# Patient Record
Sex: FEMALE | Race: WHITE | NOT HISPANIC OR LATINO
[De-identification: names, ages, dates, MRNs, and addresses within clinical notes are randomized per-mention and may not be internally consistent; named-entity substitution may affect disease eponyms.]

---

## 2022-06-30 ENCOUNTER — NON-APPOINTMENT (OUTPATIENT)
Age: 77
End: 2022-06-30

## 2022-07-01 ENCOUNTER — TRANSCRIPTION ENCOUNTER (OUTPATIENT)
Age: 77
End: 2022-07-01

## 2022-07-12 ENCOUNTER — NON-APPOINTMENT (OUTPATIENT)
Age: 77
End: 2022-07-12

## 2022-11-27 ENCOUNTER — NON-APPOINTMENT (OUTPATIENT)
Age: 77
End: 2022-11-27

## 2023-12-23 ENCOUNTER — NON-APPOINTMENT (OUTPATIENT)
Age: 78
End: 2023-12-23

## 2024-04-21 ENCOUNTER — EMERGENCY (EMERGENCY)
Facility: HOSPITAL | Age: 79
LOS: 0 days | Discharge: HOME | End: 2024-04-21
Attending: EMERGENCY MEDICINE
Payer: MEDICARE

## 2024-04-21 VITALS
SYSTOLIC BLOOD PRESSURE: 176 MMHG | TEMPERATURE: 98 F | RESPIRATION RATE: 18 BRPM | DIASTOLIC BLOOD PRESSURE: 96 MMHG | OXYGEN SATURATION: 99 % | HEART RATE: 72 BPM | HEIGHT: 64 IN | WEIGHT: 145.06 LBS

## 2024-04-21 DIAGNOSIS — E78.5 HYPERLIPIDEMIA, UNSPECIFIED: ICD-10-CM

## 2024-04-21 DIAGNOSIS — S42.212A UNSPECIFIED DISPLACED FRACTURE OF SURGICAL NECK OF LEFT HUMERUS, INITIAL ENCOUNTER FOR CLOSED FRACTURE: ICD-10-CM
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
## 2024-04-21 DIAGNOSIS — E03.9 HYPOTHYROIDISM, UNSPECIFIED: ICD-10-CM

## 2024-04-21 DIAGNOSIS — M25.512 PAIN IN LEFT SHOULDER: ICD-10-CM

## 2024-04-21 PROCEDURE — 73060 X-RAY EXAM OF HUMERUS: CPT | Mod: 26,LT

## 2024-04-21 PROCEDURE — 73060 X-RAY EXAM OF HUMERUS: CPT | Mod: LT

## 2024-04-21 PROCEDURE — 99284 EMERGENCY DEPT VISIT MOD MDM: CPT | Mod: 25

## 2024-04-21 PROCEDURE — 73030 X-RAY EXAM OF SHOULDER: CPT | Mod: 26,LT

## 2024-04-21 PROCEDURE — 73070 X-RAY EXAM OF ELBOW: CPT | Mod: LT

## 2024-04-21 PROCEDURE — 99284 EMERGENCY DEPT VISIT MOD MDM: CPT | Mod: 57

## 2024-04-21 PROCEDURE — 23600 CLTX PROX HUMRL FX W/O MNPJ: CPT | Mod: 54,LT

## 2024-04-21 PROCEDURE — 73070 X-RAY EXAM OF ELBOW: CPT | Mod: 26,LT

## 2024-04-21 PROCEDURE — 73030 X-RAY EXAM OF SHOULDER: CPT | Mod: LT

## 2024-04-21 RX ORDER — KETOROLAC TROMETHAMINE 30 MG/ML
1 SYRINGE (ML) INJECTION
Qty: 12 | Refills: 0
Start: 2024-04-21 | End: 2024-04-24

## 2024-04-21 NOTE — ED PROVIDER NOTE - NSFOLLOWUPINSTRUCTIONS_ED_ALL_ED_FT
Please follow up with your primary care physician within 24-72 hours and return immediately if symptoms worsen.    Our Emergency Department Referral Coordinators will be reaching out to you in the next 24-48 hours from 9:00am to 5:00pm with a follow up appointment. Please expect a phone call from the hospital in that time frame. If you do not receive a call or if you have any questions or concerns, you can reach them at   [phone]        Proximal Humerus Fracture    WHAT YOU NEED TO KNOW:    A proximal humerus fracture is a crack or break in the top of your upper arm bone. The proximal humerus is one of the bones in your shoulder joint.    Shoulder Anatomy         DISCHARGE INSTRUCTIONS:    Seek care immediately if:   •Your pain does not get better or gets worse, even after you rest and take medicine.      •Your arm, hand, or fingers feel numb.      •The skin over your fracture is swollen, cold, or pale.      •You cannot move your arm, hand, or fingers.      Call your doctor or orthopedist if:   •You have a fever.      •Your sling gets wet, damaged, or falls off.      •You have questions or concerns about your condition or care.      Medicines: You may need any of the following:   •Prescription pain medicine may be given. Ask your healthcare provider how to take this medicine safely. Some prescription pain medicines contain acetaminophen. Do not take other medicines that contain acetaminophen without talking to your healthcare provider. Too much acetaminophen may cause liver damage. Prescription pain medicine may cause constipation. Ask your healthcare provider how to prevent or treat constipation.       •NSAIDs, such as ibuprofen, help decrease swelling, pain, and fever. This medicine is available with or without a doctor's order. NSAIDs can cause stomach bleeding or kidney problems in certain people. If you take blood thinner medicine, always ask your healthcare provider if NSAIDs are safe for you. Always read the medicine label and follow directions.      •Acetaminophen decreases pain and fever. It is available without a doctor's order. Ask how much to take and how often to take it. Follow directions. Read the labels of all other medicines you are using to see if they also contain acetaminophen, or ask your doctor or pharmacist. Acetaminophen can cause liver damage if not taken correctly. Do not use more than 4 grams (4,000 milligrams) total of acetaminophen in one day.       •Take your medicine as directed. Contact your healthcare provider if you think your medicine is not helping or if you have side effects. Tell him of her if you are allergic to any medicine. Keep a list of the medicines, vitamins, and herbs you take. Include the amounts, and when and why you take them. Bring the list or the pill bottles to follow-up visits. Carry your medicine list with you in case of an emergency.      A sling may be needed to hold your broken bones in place. It will decrease your arm movement and allow the bones to heal.    Shoulder Sling         Manage your symptoms:   •Rest your arm as much as possible. Ask your healthcare provider when you can move your arm. Also ask when you can return to sports or vigorous exercises.      •Apply ice on your arm for 15 to 20 minutes every hour or as directed. Use an ice pack, or put crushed ice in a plastic bag. Cover it with a towel before you put it on your arm. Ice helps prevent tissue damage and decreases swelling and pain.      •Go to physical therapy as directed. A physical therapist teaches you exercises to help improve movement and strength, and to decrease pain.       Follow up with your doctor or orthopedist as directed: Write down your questions so you remember to ask them during your visits.       © Copyright [employer] 2021

## 2024-04-21 NOTE — ED ADULT TRIAGE NOTE - CHIEF COMPLAINT QUOTE
Pt BIBEMS for slip and fall and landing onto left shoulder. Inability to move left shoulder with bruising to shoulder. No Loc no dizziness no AC

## 2024-04-21 NOTE — ED ADULT NURSE NOTE - NS_SISCREENINGSR_GEN_ALL_ED
Pt, c/o cough, nasal & chest congestion,  Fever, body aches started yesterday,  Pain in  Lower chest when she coughs
Negative

## 2024-04-21 NOTE — ED PROVIDER NOTE - CLINICAL SUMMARY MEDICAL DECISION MAKING FREE TEXT BOX
Patient presented status post mechanical fall as documented, complaining primarily of left arm pain.  Denies head trauma or LOC.  Otherwise on arrival, patient afebrile, hemodynamic stable, fully neurovascularly intact, with tenderness noted to the proximal aspect of the left humerus.  X-rays obtained and revealed a proximal humeral fracture but no other acute traumatic injury.  Pain otherwise controlled in ED and patient placed in sling.  Able to ambulate without difficulty.  Given the above, will discharge home with outpatient orthopedic follow up. Patient agreeable with plan. Agrees to return to ED for any new or worsening symptoms.

## 2024-04-21 NOTE — ED PROVIDER NOTE - NSPTACCESSSVCSAPPTDETAILS_ED_ALL_ED_FT
Called patient to reschedule echo. I had to l/m.  Thank you  Electronically signed by Nancy Corea on 3/4/2022 at 3:04 PM
humeral head fx

## 2024-04-21 NOTE — ED PROVIDER NOTE - PHYSICAL EXAMINATION
Gen: NAD, AOx3  Head: NCAT  HEENT: PERRL, oral mucosa moist, normal conjunctiva, oropharynx clear without exudate or erythema  Lung: CTAB, no respiratory distress, no wheezing, rales, rhonchi  CV: normal s1/s2, rrr, Normal perfusion, pulses 2+ throughout  MSK: No edema, no visible deformities, LUE: 2+ pulse, no edema/erythema, TTP shoulder   Neuro: CN II-XII grossly intact, No focal neurologic deficits  Skin: No rash   Psych: normal affect

## 2024-04-21 NOTE — ED PROVIDER NOTE - PATIENT PORTAL LINK FT
You can access the FollowMyHealth Patient Portal offered by [facility] by registering at the following website: [email or web address]. By joining [employer]’s FollowMyHealth portal, you will also be able to view your health information using other applications (apps) compatible with our system.

## 2024-04-21 NOTE — ED PROVIDER NOTE - NSICDXPASTMEDICALHX_GEN_ALL_CORE_FT
Stable, States ready to go home, gaurav po fluids, denies pain, able to hold legs off bed and stand, ambulated to car with RN to     PAST MEDICAL HISTORY:  Hyperlipidemia     Hypothyroidism

## 2024-04-21 NOTE — ED ADULT NURSE NOTE - NSFALLHARMRISKINTERV_ED_ALL_ED

## 2024-04-21 NOTE — ED PROVIDER NOTE - OBJECTIVE STATEMENT
77 yo female with a pmh of hld and hypothyroidism presents for L shoulder pain after a mechanical fall. pt denies any head trauma/LOC. pt denies any other symptoms including fevers, chill, headache, recent illness/travel, cough, abdominal pain, chest pain, or SOB.

## 2024-04-22 PROBLEM — Z00.00 ENCOUNTER FOR PREVENTIVE HEALTH EXAMINATION: Status: ACTIVE | Noted: 2024-04-22

## 2024-04-22 NOTE — CHART NOTE - NSCHARTNOTEFT_GEN_A_CORE
[facility] MRN [id number] / Pt already has an upcoming appt 4/22 - JL    Specialty: Orthopedic

## 2024-04-23 ENCOUNTER — APPOINTMENT (OUTPATIENT)
Dept: ORTHOPEDIC SURGERY | Facility: CLINIC | Age: 79
End: 2024-04-23
Payer: MEDICARE

## 2024-04-23 VITALS — BODY MASS INDEX: 27.83 KG/M2 | WEIGHT: 163 LBS | HEIGHT: 64 IN

## 2024-04-23 PROCEDURE — 99203 OFFICE O/P NEW LOW 30 MIN: CPT

## 2024-04-23 RX ORDER — LEVOTHYROXINE SODIUM 0.07 MG/1
75 TABLET ORAL
Refills: 0 | Status: ACTIVE | COMMUNITY

## 2024-04-23 RX ORDER — LOVASTATIN 40 MG/1
40 TABLET ORAL
Refills: 0 | Status: ACTIVE | COMMUNITY

## 2024-04-23 NOTE — HISTORY OF PRESENT ILLNESS
[de-identified] : 78-year-old female here for evaluation for left shoulder fracture.  Patient reports 2 days ago she fell and landed directly on her left side.  She was seen in the ER x-rays taken confirmed an acute closed displaced comminuted humeral surgical neck fracture.  She was placed in a splint and advised to follow-up with an orthopedic specialist.  She reports her pain is well-controlled.  She denies any numbness or tingling.

## 2024-04-23 NOTE — IMAGING
[de-identified] : Physical examination of the left shoulder: Mild swelling appreciated throughout.  No ecchymosis erythema appreciated.  Skin is intact.  Tender to palpation over the left humeral head and anterior glenohumeral joint of the left shoulder.  No significant tenderness of the clavicle or AC joint.  Range of motion upon flexion and abduction of left shoulder is limited secondary to pain and swelling.  No tenderness of the cervical spine or trapezius.  Sensorimotor intact distally.  Neuro vas intact.  Weakness appreciated throughout.  X-rays left shoulder reviewed from the hospital system reveal an acute closed displaced comminuted humeral surgical neck fracture of the left shoulder.  No subluxations or dislocations.

## 2024-04-23 NOTE — DISCUSSION/SUMMARY
[de-identified] : Patient has an acute closed displaced comminuted left humeral surgical neck fracture.  We discussed the x-rays in depth.  We discussed the treatment plan that which includes rest and therapy.  Advised the patient to remain in the left arm sling at this time.  Proper use of the sling was discussed.  Proper positioning of the sling was also discussed.  Risk and benefits were expressed and she expresses full understanding.  Encourage gentle range of motion activity modification.  She remain in sling at all times at least until repeat evaluation.  Red flag symptoms were discussed.  She will take Toradol as needed for pain as prescribed by the providers in the ER.  She can also take anti-inflammatory medication as needed for pain.  No contraindications to these medications were confirmed.  Risks and benefits discussed.  Red flag symptoms discussed.   All questions and concerns addressed to patient's satisfaction. Patient expresses full understanding of treatment plan. I will have the patient follow-up with Dr. Mcgraw in 10 days for repeat x-rays and further evaluation/treatment.

## 2024-05-02 ENCOUNTER — APPOINTMENT (OUTPATIENT)
Dept: ORTHOPEDIC SURGERY | Facility: CLINIC | Age: 79
End: 2024-05-02
Payer: MEDICARE

## 2024-05-02 PROCEDURE — 73030 X-RAY EXAM OF SHOULDER: CPT | Mod: LT

## 2024-05-02 PROCEDURE — 23600 CLTX PROX HUMRL FX W/O MNPJ: CPT | Mod: LT

## 2024-05-02 PROCEDURE — 99213 OFFICE O/P EST LOW 20 MIN: CPT | Mod: 57

## 2024-05-02 NOTE — ASSESSMENT
[FreeTextEntry1] : 79-year-old right-hand-dominant woman with a minimally displaced three-part left proximal humerus fracture amenable to continue nonoperative management.  I discussed treatment options at length with patient.  She agrees to the plan for nonoperative management.  I reviewed with her pendulum exercises and elbow range of motion exercises.  Will have her check in with us in 2 weeks time for repeat evaluation and radiographs.  She can continue with the Tylenol and/or ibuprofen for pain relief.  Ice precautions discussed.  All questions were answered to her and her daughter satisfaction and they agree with the plan.

## 2024-05-02 NOTE — HISTORY OF PRESENT ILLNESS
[de-identified] : 79-year-old right-hand-dominant woman presents on referral for evaluation of the left nondominant 3 fragment 1 part proximal humerus fracture sustained as a result, fall April 22, 2024.  Seen initially at [facility] subsequently in our walk-in clinic the patient referred to my office for definitive management.  The patient has no prior history of fractures or shoulder problems.  Patient has pain localized to the area of the fracture which has been improving.  She is currently taking ibuprofen and/or Tylenol for pain relief.  No sensory complaints.  She is accompanied by her daughter.  She is utilizing a sling.  She is sleeping on a couch at night which is more comfortable.  Past medical history: Patient denies all other medical problems.  Medications: None regular  NKDA  Social:  lives with , assistance from daughter who lives close by, non-smoker, rare social EtOH

## 2024-05-14 ENCOUNTER — APPOINTMENT (OUTPATIENT)
Dept: ORTHOPEDIC SURGERY | Facility: CLINIC | Age: 79
End: 2024-05-14
Payer: MEDICARE

## 2024-05-14 PROCEDURE — 99024 POSTOP FOLLOW-UP VISIT: CPT

## 2024-05-14 PROCEDURE — 73030 X-RAY EXAM OF SHOULDER: CPT | Mod: LT

## 2024-05-14 NOTE — ASSESSMENT
[FreeTextEntry1] : 79-year-old woman with nonoperatively managed left proximal humerus fracture healing uneventfully 3 weeks after injury.  I would like her to begin active assisted forward flexion exercise and plane of the scapula.  This exercises were reviewed with the patient and her .  She can also continue pendulum and elbow range of motion exercises.  She should avoid active range of motion of her shoulder.  Sling is for comfort.  Will have her follow-up in 3 weeks time for repeat evaluation and radiographs.  At that time we can consider formal physical therapy.  All questions answered to her and her  satisfaction.  They agree with the plan.

## 2024-05-14 NOTE — IMAGING
[de-identified] : Pleasant late middle-aged woman sits comfortably in my office no distress.  She accompanied by her  in exam room.  Physical examination: Left shoulder: Minimal tenderness to palpation near the fracture.  Ecchymosis upper arm resolving.  No tenderness along acromial arch or clavicle.  Active assisted forward flexion in the plane of the scapula is 85 degrees.  No undue discomfort.  Normal light sensation about her shoulder and axial nerve distribution.  No undue swelling.  Radiographs: Left shoulder (AP, internal Tatian lateral, Y scapular): Well aligned minimally displaced 3 fragment 1 part left proximal humerus fracture with early callus formation noted.

## 2024-05-14 NOTE — HISTORY OF PRESENT ILLNESS
[de-identified] : 79-year-old returns for interval follow-up of a right proximal humerus fracture sustained as a result of a ground-level fall on April 22, 2024.  She has been utilizing the sling and has questions about whether she needs to continue the sling.  She has also been doing the pendulum exercises.  She notes occasionally she will have pain which responds to Tylenol.  The pain is very transient.  No sensory complaints.  The pain is localized to the area of her fracture site.  Denies any new trauma.  Returns accompanied by her .

## 2024-05-15 PROBLEM — E03.9 HYPOTHYROIDISM, UNSPECIFIED: Chronic | Status: ACTIVE | Noted: 2024-04-27

## 2024-05-15 PROBLEM — E78.5 HYPERLIPIDEMIA, UNSPECIFIED: Chronic | Status: ACTIVE | Noted: 2024-04-27

## 2024-06-06 ENCOUNTER — APPOINTMENT (OUTPATIENT)
Dept: ORTHOPEDIC SURGERY | Facility: CLINIC | Age: 79
End: 2024-06-06
Payer: MEDICARE

## 2024-06-06 DIAGNOSIS — S42.212A UNSPECIFIED DISPLACED FRACTURE OF SURGICAL NECK OF LEFT HUMERUS, INITIAL ENCOUNTER FOR CLOSED FRACTURE: ICD-10-CM

## 2024-06-06 PROCEDURE — 99024 POSTOP FOLLOW-UP VISIT: CPT

## 2024-06-06 PROCEDURE — 73030 X-RAY EXAM OF SHOULDER: CPT | Mod: LT

## 2024-06-06 NOTE — IMAGING
[de-identified] : Ayleen older woman walks into my office in no distress.  Sits on the exam table with ease.  Physical examination: Left shoulder: Minimal if any tenderness palpation near the fracture with no tenderness along acromial arch or clavicle.  No undue swelling.  Normal light sensation axillary nerve distribution.  No axillary lymph nodes.  Active assisted forward flexion plane is 100 degrees.  Unrestricted elbow wrist and digital range of motion.  Radiographs: Left shoulder (AP, internal rotation lateral, Y scapular): Minimally displaced left proximal humerus fracture with early callus summation.  Fracture alignment unchanged from prior radiographs.

## 2024-06-06 NOTE — HISTORY OF PRESENT ILLNESS
[de-identified] : 79-year-old woman returns interval follow-up and nonoperatively managed 3 fragment 2 part left proximal humerus fracture sustained April 22, 2024.  The patient notes her pain is significantly better.  The ecchymosis is starting to resolve.  She does not require Tylenol.  She has been doing her pendulum exercises and elbow range of motion exercises.  She is interested in advancing her therapy.  She is accompanied by her daughter.  She is quite happy with the result so far.

## 2024-07-16 ENCOUNTER — APPOINTMENT (OUTPATIENT)
Dept: ORTHOPEDIC SURGERY | Facility: CLINIC | Age: 79
End: 2024-07-16

## 2024-07-16 DIAGNOSIS — S42.212A UNSPECIFIED DISPLACED FRACTURE OF SURGICAL NECK OF LEFT HUMERUS, INITIAL ENCOUNTER FOR CLOSED FRACTURE: ICD-10-CM

## 2024-07-16 PROCEDURE — 73030 X-RAY EXAM OF SHOULDER: CPT | Mod: LT

## 2024-07-16 PROCEDURE — 99213 OFFICE O/P EST LOW 20 MIN: CPT | Mod: 24

## 2024-08-06 ENCOUNTER — APPOINTMENT (OUTPATIENT)
Dept: ORTHOPEDIC SURGERY | Facility: CLINIC | Age: 79
End: 2024-08-06

## 2024-08-06 PROBLEM — S42.021A CLOSED DISPLACED FRACTURE OF SHAFT OF RIGHT CLAVICLE, INITIAL ENCOUNTER: Status: ACTIVE | Noted: 2024-08-06

## 2024-08-06 PROCEDURE — 73000 X-RAY EXAM OF COLLAR BONE: CPT | Mod: RT

## 2024-08-06 PROCEDURE — 99213 OFFICE O/P EST LOW 20 MIN: CPT | Mod: 24,25

## 2024-08-06 PROCEDURE — 23500 CLTX CLAVICULAR FX W/O MNPJ: CPT | Mod: RT

## 2024-08-06 NOTE — ASSESSMENT
[FreeTextEntry1] : 79-year-old woman with midshaft right clavicle fracture with 2 cm shortening.  This puts her at somewhat increased risk for a nonunion.  It also puts her at risk for some loss of overhead endurance.  So relatively low demand individual.  I do not think it is reasonable to try nonoperative management.  Risk of nonunion is about 10% higher with nonoperative management.  We discussed treatment options and this is her preference.  Will have her follow-up in about a month for repeat evaluation with radiographs.  If she has no sign of healing and continued pain we can revisit discussion of treatment.  In the interim I would like her to restrict activities below shoulder height and lift no more than 1 pound.  Sling for comfort.  I have renewed her prescription for Naprosyn and famotidine for pain relief.  NSAID precautions discussed.  All questions answered to her and her daughter satisfaction and they agree with the plan.

## 2024-08-06 NOTE — HISTORY OF PRESENT ILLNESS
[de-identified] : 79-year-old woman well-known to my office for nonoperative management left proximal humerus fracture presents to the office for evaluation of right hand dominant midshaft clavicle fracture sustained while on a cruise last week resulting in a midshaft clavicle fracture.  Placed in a sling and a figure-of-eight harness the patient presents to the office to discuss treatment.  Her pain is well-controlled with diclofenac.  She is right-hand dominant.  No sensory complaints.  No shortness of breath.  She is accompanied by her daughter.

## 2024-09-05 ENCOUNTER — APPOINTMENT (OUTPATIENT)
Dept: ORTHOPEDIC SURGERY | Facility: CLINIC | Age: 79
End: 2024-09-05
Payer: MEDICARE

## 2024-09-05 DIAGNOSIS — S42.212A UNSPECIFIED DISPLACED FRACTURE OF SURGICAL NECK OF LEFT HUMERUS, INITIAL ENCOUNTER FOR CLOSED FRACTURE: ICD-10-CM

## 2024-09-05 DIAGNOSIS — S42.021A DISPLACED FRACTURE OF SHAFT OF RIGHT CLAVICLE, INITIAL ENCOUNTER FOR CLOSED FRACTURE: ICD-10-CM

## 2024-09-05 PROCEDURE — 73000 X-RAY EXAM OF COLLAR BONE: CPT | Mod: RT

## 2024-09-05 PROCEDURE — 99213 OFFICE O/P EST LOW 20 MIN: CPT | Mod: 25,24

## 2024-09-05 NOTE — ASSESSMENT
[FreeTextEntry1] : 79-year-old woman left 2 part proximal humerus fracture which has healed and regain function 4 and half months from injury.  Would not recommend further physical therapy for her left shoulder.  Her right clavicle fracture is continuing to heal.  She may have some restrictions in overhead endurance due to the amount of shortening.  She also remains at risk for a nonunion but clinically seems to be healing reasonably well.  Relatively low demand individual will continue with nonoperative management.  In 2 weeks I think she can start some physical therapy for her right shoulder.  She is a 5 pound lifting restriction at that time 1 pound at this time.  I will see her back in the office in 6 to 8 weeks time for repeat evaluation radiographs of the right clavicle.  If she develops any worsening symptoms of pain or problems she is welcome back sooner.  All questions were answered to her and her daughter satisfaction and agree with the plan.

## 2024-09-05 NOTE — IMAGING
[de-identified] : Pleasant older woman sits comfortably my office no distress.  She walked in in no distress.  She was able to get onto exam table without assistance.  Physical examination: Left shoulder: No tenderness palpation over the fracture.  No new swelling.  No deformity.  No tenderness palpation acromial arch or clavicle.  Active forward flexion left shoulder to 130 degrees.  Abduction 90 degrees.  With the shoulder held at 90 degrees of abduction external rotation 80 degrees internal rotation 60 degrees.  She has normal light sensation and actually nerve distribution and no axillary lymph nodes.  Right shoulder/clavicle: Minimal if any tenderness palpation the area of the clavicular fracture.  Small bump without significant deformity.  Active forward flexion of her right shoulder is to about 100 degrees.  With her shoulder held at 90 degrees of abduction external rotation internal rotation unrestricted.  She does not have pain with cross chest test.  No axilla lymph nodes.  Normal light sensation along chest wall.  Radiographs: Right clavicle (AP, 15 degree cephalad): Bayonet apposition midshaft clavicle fracture.  No callus formation yet.  No change in alignment.

## 2024-09-05 NOTE — HISTORY OF PRESENT ILLNESS
[de-identified] : 79-year-old returns to my office for interval check and nonoperative management left 2 part proximal humerus actually sustained April 2024 and for follow-up of a nonoperatively managed right midshaft clavicle fracture sustained August 4, 2024.  Patient is quite happy with her left shoulder function.  She had physical therapy.  She is now able to utilize her left shoulder well without complaints of pain.  Denies any sensory complaints.  Her right clavicle fracture pain has largely resolved.  She is doing little things with the right arm but has not regained full function.  Has questions about therapy for her right shoulder.  She returns today accompanied by her daughter.

## 2024-09-05 NOTE — REASON FOR VISIT
[FreeTextEntry2] : Follow-up right hand dominant midshaft clavicle fracture; follow-up left 2 part proximal humerus fracture

## 2024-11-01 ENCOUNTER — APPOINTMENT (OUTPATIENT)
Dept: ORTHOPEDIC SURGERY | Facility: CLINIC | Age: 79
End: 2024-11-01
Payer: MEDICARE

## 2024-11-01 DIAGNOSIS — S42.212A UNSPECIFIED DISPLACED FRACTURE OF SURGICAL NECK OF LEFT HUMERUS, INITIAL ENCOUNTER FOR CLOSED FRACTURE: ICD-10-CM

## 2024-11-01 DIAGNOSIS — S42.021A DISPLACED FRACTURE OF SHAFT OF RIGHT CLAVICLE, INITIAL ENCOUNTER FOR CLOSED FRACTURE: ICD-10-CM

## 2024-11-01 PROCEDURE — 73000 X-RAY EXAM OF COLLAR BONE: CPT | Mod: RT

## 2024-11-01 PROCEDURE — 99213 OFFICE O/P EST LOW 20 MIN: CPT | Mod: 24

## 2024-11-01 PROCEDURE — 73030 X-RAY EXAM OF SHOULDER: CPT | Mod: LT

## 2024-11-07 ENCOUNTER — APPOINTMENT (OUTPATIENT)
Dept: ORTHOPEDIC SURGERY | Facility: CLINIC | Age: 79
End: 2024-11-07

## 2025-02-21 ENCOUNTER — APPOINTMENT (OUTPATIENT)
Dept: ORTHOPEDIC SURGERY | Facility: CLINIC | Age: 80
End: 2025-02-21
Payer: MEDICARE

## 2025-02-21 DIAGNOSIS — S42.021A DISPLACED FRACTURE OF SHAFT OF RIGHT CLAVICLE, INITIAL ENCOUNTER FOR CLOSED FRACTURE: ICD-10-CM

## 2025-02-21 PROCEDURE — 73000 X-RAY EXAM OF COLLAR BONE: CPT | Mod: RT

## 2025-02-21 PROCEDURE — 99213 OFFICE O/P EST LOW 20 MIN: CPT
